# Patient Record
Sex: MALE | Race: WHITE | NOT HISPANIC OR LATINO | Employment: OTHER | URBAN - METROPOLITAN AREA
[De-identification: names, ages, dates, MRNs, and addresses within clinical notes are randomized per-mention and may not be internally consistent; named-entity substitution may affect disease eponyms.]

---

## 2021-02-18 ENCOUNTER — HOSPITAL ENCOUNTER (OUTPATIENT)
Dept: OTHER | Facility: HOSPITAL | Age: 76
Setting detail: RECURRING SERIES
Discharge: HOME | End: 2021-04-01
Attending: INTERNAL MEDICINE

## 2022-09-13 ENCOUNTER — HOSPITAL ENCOUNTER (EMERGENCY)
Facility: HOSPITAL | Age: 77
Discharge: HOME | End: 2022-09-13
Attending: STUDENT IN AN ORGANIZED HEALTH CARE EDUCATION/TRAINING PROGRAM | Admitting: STUDENT IN AN ORGANIZED HEALTH CARE EDUCATION/TRAINING PROGRAM

## 2022-09-13 VITALS
SYSTOLIC BLOOD PRESSURE: 136 MMHG | TEMPERATURE: 97.7 F | HEIGHT: 70 IN | WEIGHT: 192.68 LBS | DIASTOLIC BLOOD PRESSURE: 77 MMHG | OXYGEN SATURATION: 98 % | BODY MASS INDEX: 27.58 KG/M2 | RESPIRATION RATE: 16 BRPM | HEART RATE: 64 BPM

## 2022-09-13 DIAGNOSIS — H53.9 VISUAL DISTURBANCE: Primary | ICD-10-CM

## 2022-09-13 PROCEDURE — 99282 EMERGENCY DEPT VISIT SF MDM: CPT

## 2022-09-13 NOTE — DISCHARGE INSTRUCTIONS
Follow-up with your ophthalmologist to find your glasses.  Please do not hesitate to return to the emergency department if you have any concerns.  Have a wonderful day.

## 2022-09-13 NOTE — ED PROVIDER NOTES
Time: 4:56 PM EDT  Arrived by: private car  Chief Complaint: double vision  History provided by: Pt  History is limited by: N/A     History of Present Illness:  Patient is a 76 y.o. year old male who presents to the emergency department with double vision. [facility] and they dilated his eyes. Pt reports his vision was unusually blurry all day and he was having trouble watching TV.  [facility] and [facility] recommended he come to the emergency department to make sure he is not having a stroke.      Similar Symptoms Previously: no  Recently seen: yes      Patient Care Team  Primary Care Provider: Stevenson Fountain MD    Past Medical History:     No Known Allergies  Past Medical History:   Diagnosis Date   • Hypertension      Past Surgical History:   Procedure Laterality Date   • SKIN BIOPSY     • TONSILLECTOMY       Family History   Problem Relation Age of Onset   • Dementia Father        Home Medications:  Prior to Admission medications    Medication Sig Start Date End Date Taking? Authorizing Provider   amLODIPine-benazepril (LOTREL 5-20) 5-20 MG per capsule amlodipine 5 mg-benazepril 20 mg capsule    Emergency, Nurse ROGER Jessica   amLODIPine-benazepril (LOTREL) 5-40 MG per capsule  10/15/21   Emergency, Nurse Epic, RN   Aspirin Low Dose 81 MG EC tablet  12/9/21   Emergency, Nurse Epic, RN   azelastine (ASTELIN) 0.1 % nasal spray 2 sprays into the nostril(s) as directed by provider 2 (Two) Times a Day. Use in each nostril as directed 6/30/22   Derrell Boyle PA   fenofibrate (TRICOR) 145 MG tablet  11/20/21   Emergency, Nurse Jessica RN   fluticasone (FLONASE) 50 MCG/ACT nasal spray 2 sprays into the nostril(s) as directed by provider Daily. 6/30/22   Derrell Boyle PA   meloxicam (MOBIC) 15 MG tablet  9/9/21   Emergency, Nurse Jessica RN   multivitamin with minerals (MULTIVITAMIN ADULTS PO) Take 1 tablet by mouth Daily.    Emergency, Nurse Aracely RN  "  polyethylene glycol (MiraLax) 17 g packet Take 17 g by mouth Daily.    Emergency, Nurse Epic, RN   polyethylene glycol (MIRALAX) 17 GM/SCOOP powder Take 17 g by mouth.    Emergency, Nurse Aracely, RN   promethazine-dextromethorphan (PROMETHAZINE-DM) 6.25-15 MG/5ML syrup Take 5 mL by mouth 4 (Four) Times a Day As Needed for Cough. 6/30/22   Derrell Boyle PA   sildenafil (VIAGRA) 100 MG tablet Take 100 mg by mouth Daily As Needed for Erectile Dysfunction.    Emergency, Nurse Aracely, RN        Social History:   Social History     Tobacco Use   • Smoking status: Never Smoker   • Smokeless tobacco: Never Used   Vaping Use   • Vaping Use: Never used   Substance Use Topics   • Alcohol use: Yes     Alcohol/week: 7.0 standard drinks     Types: 7 Glasses of wine per week   • Drug use: Never     Recent travel: no     Review of Systems:  Review of Systems   Constitutional: Negative for chills and fever.   HENT: Negative for congestion, rhinorrhea and sore throat.    Eyes: Positive for visual disturbance. Negative for pain.   Respiratory: Negative for apnea, cough, chest tightness and shortness of breath.    Cardiovascular: Negative for chest pain and palpitations.   Gastrointestinal: Negative for abdominal pain, diarrhea, nausea and vomiting.   Genitourinary: Negative for difficulty urinating and dysuria.   Musculoskeletal: Negative for joint swelling and myalgias.   Skin: Negative for color change.   Neurological: Negative for seizures and headaches.   Psychiatric/Behavioral: Negative.    All other systems reviewed and are negative.       Physical Exam:  /77   Pulse 64   Temp 97.7 °F (36.5 °C) (Oral)   Resp 16   Ht 177.8 cm (70\")   Wt 87.4 kg (192 lb 10.9 oz)   SpO2 98%   BMI 27.65 kg/m²     Physical Exam  Vitals and nursing note reviewed.   Constitutional:       General: He is not in acute distress.     Appearance: Normal appearance. He is not toxic-appearing.   HENT:      Head: Normocephalic and atraumatic.    "   Jaw: There is normal jaw occlusion.   Eyes:      General: Lids are normal.      Extraocular Movements: Extraocular movements intact.      Conjunctiva/sclera: Conjunctivae normal.      Pupils: Pupils are equal, round, and reactive to light.   Cardiovascular:      Rate and Rhythm: Normal rate and regular rhythm.      Pulses: Normal pulses.      Heart sounds: Normal heart sounds.   Pulmonary:      Effort: Pulmonary effort is normal. No respiratory distress.      Breath sounds: Normal breath sounds. No wheezing or rhonchi.   Abdominal:      General: Abdomen is flat.      Palpations: Abdomen is soft.      Tenderness: There is no abdominal tenderness. There is no guarding or rebound.   Musculoskeletal:         General: Normal range of motion.      Cervical back: Normal range of motion and neck supple.      Right lower leg: No edema.      Left lower leg: No edema.   Skin:     General: Skin is warm and dry.   Neurological:      General: No focal deficit present.      Mental Status: He is alert and oriented to person, place, and time. Mental status is at baseline.      Cranial Nerves: No cranial nerve deficit.      Sensory: No sensory deficit.      Motor: No weakness.   Psychiatric:         Mood and Affect: Mood normal.                Medications in the Emergency Department:  Medications - No data to display     Labs  Lab Results (last 24 hours)     ** No results found for the last 24 hours. **           Imaging:  No Radiology Exams Resulted Within Past 24 Hours    Procedures:  Procedures    Progress                            Medical Decision Making:  MDM  Number of Diagnoses or Management Options  Visual disturbance  Diagnosis management comments: After speaking with the patient it was determined that he had grabbed the wrong glasses from the ophthalmologist yesterday and had taken a test sample.  The glasses he was trying to wear had no prescription in the middle were clear.  Once he had his regular glasses on he could  see normally.  Patient neuro intact.       Amount and/or Complexity of Data Reviewed  Obtain history from someone other than the patient: yes         Final diagnoses:   Visual disturbance        Disposition:  ED Disposition     ED Disposition   Discharge    Condition   Stable    Comment   --             This medical record created using voice recognition software.             Mearry Mejia  09/13/22 1304       Dulce Fuchs MD  09/13/22 6180

## 2022-11-24 ENCOUNTER — HOSPITAL ENCOUNTER (EMERGENCY)
Facility: HOSPITAL | Age: 77
Discharge: HOME | End: 2022-11-24
Attending: EMERGENCY MEDICINE | Admitting: EMERGENCY MEDICINE

## 2022-11-24 VITALS
TEMPERATURE: 97.8 F | OXYGEN SATURATION: 98 % | BODY MASS INDEX: 27.3 KG/M2 | HEART RATE: 76 BPM | WEIGHT: 190.7 LBS | RESPIRATION RATE: 20 BRPM | SYSTOLIC BLOOD PRESSURE: 126 MMHG | HEIGHT: 70 IN | DIASTOLIC BLOOD PRESSURE: 73 MMHG

## 2022-11-24 DIAGNOSIS — J10.1 INFLUENZA A: Primary | ICD-10-CM

## 2022-11-24 LAB
FLUAV AG NPH QL: POSITIVE
FLUBV AG NPH QL IA: NEGATIVE

## 2022-11-24 PROCEDURE — 99283 EMERGENCY DEPT VISIT LOW MDM: CPT

## 2022-11-24 PROCEDURE — 87804 INFLUENZA ASSAY W/OPTIC: CPT

## 2022-11-24 NOTE — ED PROVIDER NOTES
Time: 4:19 PM EST    Chief Complaint: nasal congestion, cough, near syncope  History provided by: patient  History is limited by: N/A     History of Present Illness:  Patient is a 77 y.o. year old male who presents to the emergency department with nasal congestion, cough, near syncope. Patient states that he has been babysitting his grandson over the weekend. Patient states that his grandson has been tested positive for flu. Patient states that he has exposure to flu since he is babysitting his grandson. Patient states that his symptoms started Monday and it worsened as the week progressed. Patient states that he came to ED since he went to two urgent care prior to ED arrival. He states that both the urgent care centers were closed so he came to the ED. Patient states that he was taking a shower today when he got dizzy and had a near syncopal episode. Patient states that the dizziness is resolved now. Patient reports of myalgias. Patient reports of cough. Patient states that he took over the counter cough medicines, nasal spray, and decongestant. He states that the medications have improved his symptoms a little.       History provided by:  Patient   used: No        Similar Symptoms Previously: yes  Recently seen: no      Patient Care Team  Primary Care Provider: Stevenson Fountain MD    Past Medical History:     No Known Allergies  Past Medical History:   Diagnosis Date   • Hyperlipidemia    • Hypertension      Past Surgical History:   Procedure Laterality Date   • SKIN BIOPSY     • TONSILLECTOMY       Family History   Problem Relation Age of Onset   • Dementia Father        Home Medications:  Prior to Admission medications    Medication Sig Start Date End Date Taking? Authorizing Provider   amLODIPine-benazepril (LOTREL) 5-40 MG per capsule  10/15/21   Emergency, Nurse Epic, RN   Aspirin Low Dose 81 MG EC tablet  12/9/21   Emergency, Nurse Epic, RN   azelastine (ASTELIN) 0.1 % nasal spray 2 sprays  into the nostril(s) as directed by provider 2 (Two) Times a Day. Use in each nostril as directed 9/19/22   Isis Sanchez APRN   fenofibrate (TRICOR) 145 MG tablet  11/20/21   Emergency, Nurse Aracely RN   fluticasone (FLONASE) 50 MCG/ACT nasal spray 2 sprays into the nostril(s) as directed by provider Daily. 9/19/22   Isis Sanchez APRN   meloxicam (MOBIC) 15 MG tablet  9/9/21   Emergency, Nurse Aracely RN   multivitamin with minerals tablet tablet Take 1 tablet by mouth Daily.    Nurse Aracely Abreu RN   polyethylene glycol (MIRALAX) 17 g packet Take 17 g by mouth Daily.    Nurse Aracely Abreu RN   polyethylene glycol (MIRALAX) 17 GM/SCOOP powder Take 17 g by mouth.    Nurse Aracely Abreu RN   promethazine-dextromethorphan (PROMETHAZINE-DM) 6.25-15 MG/5ML syrup Take 5 mL by mouth 4 (Four) Times a Day As Needed for Cough. 6/30/22   Derrell Boyle PA   sildenafil (VIAGRA) 100 MG tablet Take 100 mg by mouth Daily As Needed for Erectile Dysfunction.    Emergency, Nurse Aracely RN        Social History:   Social History     Tobacco Use   • Smoking status: Never   • Smokeless tobacco: Never   Vaping Use   • Vaping Use: Never used   Substance Use Topics   • Alcohol use: Yes     Alcohol/week: 7.0 standard drinks     Types: 7 Glasses of wine per week   • Drug use: Never         Review of Systems:  Review of Systems   Constitutional: Negative for chills, diaphoresis and fever.   HENT: Positive for congestion. Negative for postnasal drip, rhinorrhea and sore throat.    Eyes: Negative for photophobia.   Respiratory: Positive for cough. Negative for chest tightness and shortness of breath.    Cardiovascular: Negative for chest pain, palpitations and leg swelling.   Gastrointestinal: Negative for abdominal pain, diarrhea, nausea and vomiting.   Genitourinary: Negative for difficulty urinating, dysuria, flank pain, frequency, hematuria and urgency.   Musculoskeletal: Positive for myalgias. Negative for neck  "pain and neck stiffness.   Skin: Negative for pallor and rash.   Neurological: Positive for dizziness and syncope. Negative for weakness, numbness and headaches.   Hematological: Negative for adenopathy. Does not bruise/bleed easily.   Psychiatric/Behavioral: Negative.         Physical Exam:  /67 (BP Location: Right arm, Patient Position: Sitting)   Pulse 98   Temp 97.8 °F (36.6 °C) (Oral)   Resp 20   Ht 177.8 cm (70\")   Wt 86.5 kg (190 lb 11.2 oz)   SpO2 99%   BMI 27.36 kg/m²     Physical Exam  Vitals and nursing note reviewed.   Constitutional:       General: He is not in acute distress.     Appearance: Normal appearance. He is not toxic-appearing.   HENT:      Head: Normocephalic and atraumatic.      Jaw: There is normal jaw occlusion.   Eyes:      General: Lids are normal.      Extraocular Movements: Extraocular movements intact.      Conjunctiva/sclera: Conjunctivae normal.      Pupils: Pupils are equal, round, and reactive to light.   Cardiovascular:      Rate and Rhythm: Normal rate and regular rhythm.      Pulses: Normal pulses.      Heart sounds: Normal heart sounds.   Pulmonary:      Effort: Pulmonary effort is normal. No respiratory distress.      Breath sounds: Normal breath sounds. No wheezing or rhonchi.   Abdominal:      General: Abdomen is flat.      Palpations: Abdomen is soft.      Tenderness: There is no abdominal tenderness. There is no guarding or rebound.   Musculoskeletal:         General: Normal range of motion.      Cervical back: Normal range of motion and neck supple.      Right lower leg: No edema.      Left lower leg: No edema.   Skin:     General: Skin is warm and dry.   Neurological:      Mental Status: He is alert and oriented to person, place, and time. Mental status is at baseline.   Psychiatric:         Mood and Affect: Mood normal.                Medications in the Emergency Department:  Medications - No data to display     Labs  Lab Results (last 24 hours)     " Procedure Component Value Units Date/Time    Influenza Antigen, Rapid - Swab, Nasopharynx [[id number]]  (Abnormal) Collected: 11/24/22 1524    Specimen: Swab from Nasopharynx Updated: 11/24/22 1605     Influenza A Ag, EIA Positive     Influenza B Ag, EIA Negative           Imaging:  No Radiology Exams Resulted Within Past 24 Hours    Procedures:  Procedures    Progress                            Medical Decision Making:  MDM  Number of Diagnoses or Management Options  Influenza A  Diagnosis management comments: In summary this is a 77-year-old male who presents to the emergency department for evaluation of cough, body aches, not feeling well.  His grandson is recently tested positive for influenza.  Patient's wife has the same symptoms.  He is otherwise nontoxic-appearing and in no acute respiratory distress.  His influenza test has been positive for influenza A today.  He has been instructed on conservative treatment.  Very strict return to ER and follow-up instructions have been provided to the patient.         Final diagnoses:   Influenza A        Disposition:  ED Disposition     ED Disposition   Discharge    Condition   Stable    Comment   --             This medical record created using voice recognition software.        Documentation assistance provided by Karyn aSul acting as scribe for Enoch Hernandez MD. Information recorded by the scribe was done at my direction and has been verified and validated by me.          Karyn Saul  11/24/22 8688       Enoch Hernandez MD  11/24/22 1118

## 2023-08-25 ENCOUNTER — TRANSCRIBE ORDERS (OUTPATIENT)
Dept: ADMINISTRATIVE | Facility: HOSPITAL | Age: 78
End: 2023-08-25
Payer: OTHER GOVERNMENT

## 2023-08-25 DIAGNOSIS — M18.9 OSTEOARTHRITIS OF CARPOMETACARPAL (CMC) JOINT OF THUMB, UNSPECIFIED LATERALITY, UNSPECIFIED OSTEOARTHRITIS TYPE: Primary | ICD-10-CM

## 2023-10-05 ENCOUNTER — HOSPITAL ENCOUNTER (OUTPATIENT)
Dept: BONE DENSITY | Facility: HOSPITAL | Age: 78
Discharge: HOME | End: 2023-10-05
Admitting: INTERNAL MEDICINE
Payer: OTHER GOVERNMENT

## 2023-10-05 DIAGNOSIS — M18.9 OSTEOARTHRITIS OF CARPOMETACARPAL (CMC) JOINT OF THUMB, UNSPECIFIED LATERALITY, UNSPECIFIED OSTEOARTHRITIS TYPE: ICD-10-CM

## 2023-10-05 PROCEDURE — 77080 DXA BONE DENSITY AXIAL: CPT
